# Patient Record
Sex: FEMALE | Race: WHITE | NOT HISPANIC OR LATINO | URBAN - METROPOLITAN AREA
[De-identification: names, ages, dates, MRNs, and addresses within clinical notes are randomized per-mention and may not be internally consistent; named-entity substitution may affect disease eponyms.]

---

## 2019-12-23 ENCOUNTER — OFFICE (OUTPATIENT)
Dept: URBAN - METROPOLITAN AREA CLINIC 10 | Facility: CLINIC | Age: 44
End: 2019-12-23

## 2019-12-23 VITALS
DIASTOLIC BLOOD PRESSURE: 67 MMHG | HEART RATE: 80 BPM | WEIGHT: 293 LBS | SYSTOLIC BLOOD PRESSURE: 131 MMHG | HEIGHT: 68 IN

## 2019-12-23 DIAGNOSIS — K76.89 OTHER SPECIFIED DISEASES OF LIVER: ICD-10-CM

## 2019-12-23 DIAGNOSIS — K76.9 LIVER DISEASE, UNSPECIFIED: ICD-10-CM

## 2019-12-23 PROCEDURE — 99213 OFFICE O/P EST LOW 20 MIN: CPT | Performed by: PHYSICIAN ASSISTANT

## 2019-12-23 NOTE — SERVICEHPINOTES
Ms. Fontenot is 43 years old. She is here in consultation for Kaela Corey. She used to see Dr. Dickinson. Records were reviewed. Pt has past hx of stable focal nodular hyperplasia, asymptomatic gallstones, and fatty liver since 2015. She has had multiple MRIs and USGs which are noted below. She is here today for evaluation of findings on recent USG. She denies any GI complaints such as n/v/d/c, abdominal pain, or any overt GI bleeding. She does mention she "eats until she quits" because she doesn't ever feel full. Says her family members have similar issues with eating. Denies ETOH. 11/8/19: Abd USG- stable focal nodular hyperplasia, hepatic steatosis, choletlithiasis8/17/19: MRI liver-hepatic lesions c/w focal nodular hyperplasia, hemangiomas in R lobe. Radiology impression was bening findings and no additional f/u required. Cholelithiasis. Fatty liver. 4/17/15: MRI ABD w/wo contrast-2 foci of focal nodular hyperplasia, stable cholelithiasis, steatosis. 1/2/2015: MRI ABD w/wo contrast-steatosis, 2 subcm hemangiomas, cholelithiasis, focal nodular hyperplasia lesions